# Patient Record
Sex: FEMALE | Race: WHITE | NOT HISPANIC OR LATINO | URBAN - METROPOLITAN AREA
[De-identification: names, ages, dates, MRNs, and addresses within clinical notes are randomized per-mention and may not be internally consistent; named-entity substitution may affect disease eponyms.]

---

## 2023-12-24 ENCOUNTER — EMERGENCY (EMERGENCY)
Facility: HOSPITAL | Age: 88
LOS: 0 days | Discharge: HOME | End: 2023-12-24
Attending: EMERGENCY MEDICINE
Payer: MEDICARE

## 2023-12-24 VITALS
DIASTOLIC BLOOD PRESSURE: 78 MMHG | OXYGEN SATURATION: 96 % | TEMPERATURE: 98 F | RESPIRATION RATE: 18 BRPM | SYSTOLIC BLOOD PRESSURE: 171 MMHG | HEART RATE: 69 BPM

## 2023-12-24 VITALS
SYSTOLIC BLOOD PRESSURE: 236 MMHG | DIASTOLIC BLOOD PRESSURE: 92 MMHG | HEART RATE: 72 BPM | WEIGHT: 130.07 LBS | OXYGEN SATURATION: 96 % | HEIGHT: 59 IN | TEMPERATURE: 98 F | RESPIRATION RATE: 18 BRPM

## 2023-12-24 DIAGNOSIS — S83.91XA SPRAIN OF UNSPECIFIED SITE OF RIGHT KNEE, INITIAL ENCOUNTER: ICD-10-CM

## 2023-12-24 DIAGNOSIS — Z88.5 ALLERGY STATUS TO NARCOTIC AGENT: ICD-10-CM

## 2023-12-24 DIAGNOSIS — M25.571 PAIN IN RIGHT ANKLE AND JOINTS OF RIGHT FOOT: ICD-10-CM

## 2023-12-24 DIAGNOSIS — S93.401A SPRAIN OF UNSPECIFIED LIGAMENT OF RIGHT ANKLE, INITIAL ENCOUNTER: ICD-10-CM

## 2023-12-24 DIAGNOSIS — M25.561 PAIN IN RIGHT KNEE: ICD-10-CM
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
## 2023-12-24 PROCEDURE — 73590 X-RAY EXAM OF LOWER LEG: CPT | Mod: RT

## 2023-12-24 PROCEDURE — 73590 X-RAY EXAM OF LOWER LEG: CPT | Mod: 26,RT

## 2023-12-24 PROCEDURE — 93970 EXTREMITY STUDY: CPT

## 2023-12-24 PROCEDURE — 99284 EMERGENCY DEPT VISIT MOD MDM: CPT | Mod: 25

## 2023-12-24 PROCEDURE — 73562 X-RAY EXAM OF KNEE 3: CPT | Mod: 26,RT

## 2023-12-24 PROCEDURE — 73562 X-RAY EXAM OF KNEE 3: CPT | Mod: RT

## 2023-12-24 PROCEDURE — 73610 X-RAY EXAM OF ANKLE: CPT | Mod: RT

## 2023-12-24 PROCEDURE — 99285 EMERGENCY DEPT VISIT HI MDM: CPT

## 2023-12-24 PROCEDURE — 93010 ELECTROCARDIOGRAM REPORT: CPT

## 2023-12-24 PROCEDURE — 93005 ELECTROCARDIOGRAM TRACING: CPT

## 2023-12-24 PROCEDURE — 93970 EXTREMITY STUDY: CPT | Mod: 26

## 2023-12-24 PROCEDURE — 73610 X-RAY EXAM OF ANKLE: CPT | Mod: 26,RT

## 2023-12-24 NOTE — ED PROVIDER NOTE - PATIENT PORTAL LINK FT
You can access the FollowMyHealth Patient Portal offered by [facility] by registering at the following website: [email or web address]. By joining [employer]’s FollowMyHealth portal, you will also be able to view your health information using other applications (apps) compatible with our system. You can access the FollowMyHealth Patient Portal offered by [facility] by registering at the following website: [email or web address]. By joining [employer]’s FollowMyHealth portal, you will also be able to view your health information using other applications (apps) compatible with our system.

## 2023-12-24 NOTE — ED ADULT NURSE NOTE - NSFALLRISKINTERV_ED_ALL_ED
Communicate fall risk and risk factors to all staff, patient, and family/Provide visual cue: yellow wristband, yellow gown, etc/Reinforce activity limits and safety measures with patient and family/Call bell, personal items and telephone in reach/Instruct patient to call for assistance before getting out of bed/chair/stretcher/Non-slip footwear applied when patient is off stretcher/[place] to call system/Physically safe environment - no spills, clutter or unnecessary equipment/Purposeful Proactive Rounding/Room/bathroom lighting operational, light cord in reach Communicate fall risk and risk factors to all staff, patient, and family/Provide visual cue: yellow wristband, yellow gown, etc/Reinforce activity limits and safety measures with patient and family/Call bell, personal items and telephone in reach/Instruct patient to call for assistance before getting out of bed/chair/stretcher/Non-slip footwear applied when patient is off stretcher/[place] to call system/Physically safe environment - no spills, clutter or unnecessary equipment/Purposeful Proactive Rounding/Room/bathroom lighting operational, light cord in reach

## 2023-12-24 NOTE — ED ADULT TRIAGE NOTE - CHIEF COMPLAINT QUOTE
Patient c/o right knee pain after tripping and falling wednesday. Denies head trauma or anticoagulation use

## 2023-12-24 NOTE — ED PROVIDER NOTE - PHYSICAL EXAMINATION
VITAL SIGNS: I have reviewed nursing notes and confirm.  CONSTITUTIONAL: Patient is in no acute distress.  SKIN: Skin exam is warm and dry, no acute rash.  HEAD: Normocephalic; atraumatic.  EYES: PERRL, EOM intact; conjunctiva and sclera clear.  ENT: No nasal discharge; airway clear.   NECK: Supple; non tender.  CARD: S1, S2 normal; no murmurs, gallops, or rubs. Regular rate and rhythm.  RESP: Clear to auscultation bilaterally. No wheezes, rales or rhonchi.  ABD: Normal bowel sounds; soft; non-distended; non-tender.   EXT: Normal ROM. No edema. +Tenderness to right lateral malleolus. +Tenderness to popliteal region of right knee.   LYMPH: No acute cervical adenopathy.  NEURO: Alert, oriented. Grossly unremarkable. No focal deficits.  PSYCH: Cooperative, appropriate.

## 2023-12-24 NOTE — ED PROVIDER NOTE - CLINICAL SUMMARY MEDICAL DECISION MAKING FREE TEXT BOX
Pt slipped down two steps. Sprain to the right knee and right ankle. X-ray neg for fracture. US neg for DVT. Pt walks well.

## 2023-12-24 NOTE — ED PROVIDER NOTE - ATTENDING APP SHARED VISIT CONTRIBUTION OF CARE
Slipped down two steps on Wednesday. complains of right ankle and knee pain. On exam there is mild swelling lateral malleolus. Tenderness behind right knee. No bruising.

## 2023-12-24 NOTE — ED PROVIDER NOTE - OBJECTIVE STATEMENT
87 yo F presenting with right knee pain and right ankle pain. Reports associated swelling. Patient reports having a mechanical fall down 2 steps on Wednesday. No head injury or LOC, back pain, headache, dizziness, paresthesias, or weakness.

## 2025-04-09 ENCOUNTER — EMERGENCY (EMERGENCY)
Facility: HOSPITAL | Age: 89
LOS: 0 days | Discharge: HOME | End: 2025-04-09
Attending: EMERGENCY MEDICINE
Payer: MEDICARE

## 2025-04-09 VITALS
OXYGEN SATURATION: 98 % | HEART RATE: 67 BPM | SYSTOLIC BLOOD PRESSURE: 193 MMHG | TEMPERATURE: 98 F | DIASTOLIC BLOOD PRESSURE: 73 MMHG | RESPIRATION RATE: 18 BRPM

## 2025-04-09 VITALS
HEIGHT: 59 IN | TEMPERATURE: 98 F | OXYGEN SATURATION: 99 % | RESPIRATION RATE: 18 BRPM | SYSTOLIC BLOOD PRESSURE: 201 MMHG | HEART RATE: 69 BPM | WEIGHT: 130.07 LBS | DIASTOLIC BLOOD PRESSURE: 92 MMHG

## 2025-04-09 DIAGNOSIS — Z88.5 ALLERGY STATUS TO NARCOTIC AGENT: ICD-10-CM

## 2025-04-09 DIAGNOSIS — I10 ESSENTIAL (PRIMARY) HYPERTENSION: ICD-10-CM

## 2025-04-09 DIAGNOSIS — E11.9 TYPE 2 DIABETES MELLITUS WITHOUT COMPLICATIONS: ICD-10-CM

## 2025-04-09 DIAGNOSIS — G89.29 OTHER CHRONIC PAIN: ICD-10-CM

## 2025-04-09 DIAGNOSIS — Z87.891 PERSONAL HISTORY OF NICOTINE DEPENDENCE: ICD-10-CM

## 2025-04-09 DIAGNOSIS — E78.5 HYPERLIPIDEMIA, UNSPECIFIED: ICD-10-CM

## 2025-04-09 PROCEDURE — 99283 EMERGENCY DEPT VISIT LOW MDM: CPT

## 2025-04-09 PROCEDURE — 99282 EMERGENCY DEPT VISIT SF MDM: CPT

## 2025-04-09 NOTE — ED PROVIDER NOTE - PATIENT PORTAL LINK FT
You can access the FollowMyHealth Patient Portal offered by [facility] by registering at the following website: [email or web address]. By joining [email or web address]’s FollowMyHealth portal, you will also be able to view your health information using other applications (apps) compatible with our system.

## 2025-04-09 NOTE — ED PROVIDER NOTE - OBJECTIVE STATEMENT
89-year-old female history of hypertension, diabetes, hyperlipidemia, hypothyroid presenting to ER for evaluation of elevated blood pressure.  Patient had routine appointment with her spine/pain management doctor today.  States in the office her blood pressure was elevated to be over 200 systolic.  Patient states took her carvedilol and enalapril this morning.  She denies any acute complaints in ED including worsening back pain, chest pain, shortness of breath, abdominal pain, nausea, vomiting, urinary symptoms, alterations in bowel habits, weakness, headache, dizziness.

## 2025-04-09 NOTE — ED ADULT NURSE NOTE - OBJECTIVE STATEMENT
Pt presents to the Ed w/ c/o hypertension. Pt was at a pmd appointment when they told her she was having hbp and was referred to come to the ED.

## 2025-04-09 NOTE — ED ADULT TRIAGE NOTE - CHIEF COMPLAINT QUOTE
Pt presents to the Ed w/ c/o hypertension. Pt was at a pmd appointment when they told her she was having hbp and was referred to come to the ED. Pt denies having dizziness of headache. Pmhx HTN, DM , HLD, Hypothyroidism.

## 2025-04-09 NOTE — ED PROVIDER NOTE - ATTENDING APP SHARED VISIT CONTRIBUTION OF CARE
89-year-old female PMH HTN, hypothyroidism, HLD, chronic back pain presented to ED with asymptomatic elevation of blood pressure.  Patient is compliant with her BP meds, takes them twice a day.  Today she went to pain management doctor for evaluation for possible epidural injections for treatment of low back pain.  Blood pressure reading was high in the office and again upon returning home.  Patient denies any complaints.  Elderly female resting in the stretcher smiling and in no acute distress, speaking full sentences, lungs clear to auscultation,  RRR, well-perfused extremities, distal pulses intact, abdomen nontender palpation, awake and alert x 3, no gross neurodeficits, normal mood and affect. Collateral information obtained by the patient's daughter.  Vital signs reviewed, BP improved without any intervention.  Patient stable for discharge home at present, she is due for her medications later tonight.  Anticipatory guidance provided, strict return precautions given.  Both patient and daughter verbalized understanding and are amenable to DC plan.